# Patient Record
Sex: FEMALE | Race: WHITE | NOT HISPANIC OR LATINO | Employment: OTHER | URBAN - NONMETROPOLITAN AREA
[De-identification: names, ages, dates, MRNs, and addresses within clinical notes are randomized per-mention and may not be internally consistent; named-entity substitution may affect disease eponyms.]

---

## 2021-02-25 ENCOUNTER — IMMUNIZATION (OUTPATIENT)
Dept: VACCINE CLINIC | Facility: HOSPITAL | Age: 66
End: 2021-02-25

## 2021-02-25 PROCEDURE — 0001A: CPT | Performed by: INTERNAL MEDICINE

## 2021-02-25 PROCEDURE — 91300 HC SARSCOV02 VAC 30MCG/0.3ML IM: CPT | Performed by: INTERNAL MEDICINE

## 2021-03-18 ENCOUNTER — IMMUNIZATION (OUTPATIENT)
Dept: VACCINE CLINIC | Facility: HOSPITAL | Age: 66
End: 2021-03-18

## 2021-03-18 PROCEDURE — 91300 HC SARSCOV02 VAC 30MCG/0.3ML IM: CPT | Performed by: INTERNAL MEDICINE

## 2021-03-18 PROCEDURE — 0002A: CPT | Performed by: INTERNAL MEDICINE

## 2021-10-20 ENCOUNTER — APPOINTMENT (OUTPATIENT)
Dept: VACCINE CLINIC | Facility: HOSPITAL | Age: 66
End: 2021-10-20

## 2021-10-27 ENCOUNTER — IMMUNIZATION (OUTPATIENT)
Dept: VACCINE CLINIC | Facility: HOSPITAL | Age: 66
End: 2021-10-27

## 2021-10-27 PROCEDURE — 91300 HC SARSCOV02 VAC 30MCG/0.3ML IM: CPT | Performed by: INTERNAL MEDICINE

## 2021-10-27 PROCEDURE — 0004A ADM SARSCOV2 30MCG/0.3ML BOOSTER: CPT | Performed by: INTERNAL MEDICINE

## 2024-07-11 PROBLEM — Z79.899 HIGH RISK MEDICATION USE: Status: ACTIVE | Noted: 2024-07-11

## 2024-07-11 PROBLEM — Z79.1 LONG TERM (CURRENT) USE OF NON-STEROIDAL ANTI-INFLAMMATORIES (NSAID): Status: ACTIVE | Noted: 2024-07-11

## 2024-07-11 PROBLEM — M05.79 RHEUMATOID ARTHRITIS INVOLVING MULTIPLE SITES WITH POSITIVE RHEUMATOID FACTOR: Status: ACTIVE | Noted: 2024-07-11

## 2024-07-11 PROBLEM — R53.83 OTHER FATIGUE: Status: ACTIVE | Noted: 2024-07-11

## 2024-07-11 NOTE — ASSESSMENT & PLAN NOTE
dx 1998 PG; sero +  Daughters Mikayla Acosta  Current:  mtx, Celebrex;   Prior Plaquenil stopped by Ophthalmology 2023  declines prednisone/Enbrel historically  prior ssz intolerant, etodolac (gastritis),    Low dz activity.  Swollen joint count 0.  Tender joint count 0.  Good prognosis  continue mtx and celebrex.  Medications effective and well tolerated    Labs reviewed in [facility] from 4/24/2024.  ALT was mildly elevated at 37.  Otherwise labs are stable.  Medication refilled.  She does have inactivity gelling that we discussed is due to osteoarthritis.  The patient is doing satisfactorily on the current medical therapy.   Plan will be to continue current medication, frequent intensive lab monitoring every 12 weeks (CBC, CMP) for toxicity monitoring and follow up in 3 months.  Request latest bone density scan from PCP

## 2024-07-11 NOTE — ASSESSMENT & PLAN NOTE
Intermittent use of celecoxib.    Risks of NSAIDs discussed including GI upset, GI bleeding, renal and hepatic risks and the risks of cardiovascular disease and stroke. Warned patient not to take with other NSAIDs including OTC NSAIDs.

## 2024-07-11 NOTE — ASSESSMENT & PLAN NOTE
Chest x-ray 4/16/2024 reviewed in [facility].  Chest x-ray showed no acute cardiopulmonary process  Lab testing was stable for 2/2024.  This was reviewed in [facility].  Labs included thyroid testing and iron testing.  Vitamin D and B12 were also normal.

## 2024-07-11 NOTE — ASSESSMENT & PLAN NOTE
Methotrexate    labs q 12 weeks for toxicity monitoring.  meds are well-tolerated and effective.    Labs reviewed and are stable   We discussed DMARD therapy including Plaquenil, Sulfasalazine,  Leflunomide, and MTX and the alternative of not being treated. MTX was chosen. Risks include but are not limited to severe liver damage that can be fatal, the possible need for liver biopsy, bone marrow suppression that can lead to dangerously low blood counts, GI side effects including mouth sores and diarrhea, fatigue, and rare risk of severe pulmonary complications. There should be no alcohol consumed with MTX. MTX can cause severe fetal abnormalities whether the mother or father is taking the medication and thus must be avoided if pregnancy is a possibility.  All medication is to be taken one day a week only. The need for q 12 week labs and the need for folic acid supplementation were discussed.

## 2024-07-15 PROBLEM — R79.89 ELEVATED LIVER FUNCTION TESTS: Status: ACTIVE | Noted: 2024-07-15

## 2024-07-16 ENCOUNTER — OFFICE VISIT (OUTPATIENT)
Age: 69
End: 2024-07-16
Payer: MEDICARE

## 2024-07-16 VITALS
HEIGHT: 63 IN | TEMPERATURE: 97.6 F | HEART RATE: 98 BPM | DIASTOLIC BLOOD PRESSURE: 84 MMHG | BODY MASS INDEX: 32.18 KG/M2 | SYSTOLIC BLOOD PRESSURE: 146 MMHG | WEIGHT: 181.6 LBS

## 2024-07-16 DIAGNOSIS — R53.83 OTHER FATIGUE: ICD-10-CM

## 2024-07-16 DIAGNOSIS — M05.79 RHEUMATOID ARTHRITIS INVOLVING MULTIPLE SITES WITH POSITIVE RHEUMATOID FACTOR: Primary | ICD-10-CM

## 2024-07-16 DIAGNOSIS — Z79.899 HIGH RISK MEDICATION USE: ICD-10-CM

## 2024-07-16 DIAGNOSIS — Z79.1 LONG TERM (CURRENT) USE OF NON-STEROIDAL ANTI-INFLAMMATORIES (NSAID): ICD-10-CM

## 2024-07-16 PROCEDURE — 1160F RVW MEDS BY RX/DR IN RCRD: CPT | Performed by: NURSE PRACTITIONER

## 2024-07-16 PROCEDURE — G2211 COMPLEX E/M VISIT ADD ON: HCPCS | Performed by: NURSE PRACTITIONER

## 2024-07-16 PROCEDURE — 1159F MED LIST DOCD IN RCRD: CPT | Performed by: NURSE PRACTITIONER

## 2024-07-16 PROCEDURE — 99214 OFFICE O/P EST MOD 30 MIN: CPT | Performed by: NURSE PRACTITIONER

## 2024-07-16 RX ORDER — METHOTREXATE 2.5 MG/1
20 TABLET ORAL WEEKLY
Qty: 96 TABLET | Refills: 1 | Status: SHIPPED | OUTPATIENT
Start: 2024-07-16

## 2024-07-16 RX ORDER — ERGOCALCIFEROL 1.25 MG/1
1 CAPSULE ORAL WEEKLY
COMMUNITY
Start: 2024-05-26

## 2024-07-16 RX ORDER — CELECOXIB 200 MG/1
200 CAPSULE ORAL DAILY PRN
Qty: 90 CAPSULE | Refills: 1 | Status: SHIPPED | OUTPATIENT
Start: 2024-07-16

## 2024-07-16 NOTE — PROGRESS NOTES
Office Visit       Date: 2024   Patient Name: Bonita Acosta  MRN: [id number]  YOB: 1955    Referring Physician: Heavenly Estrada MD     Chief Complaint:   Chief Complaint   Patient presents with    Rheumatoid Arthritis       History of Present Illness: Bonita Acosta is a 68 y.o. female who is here today for follow-up of rheumatoid arthritis.  Today she reports feeling the same.  She rates her pain 2 out of 10 and has 1 hour of morning stiffness.  She denies any recent injuries or infections.  She would like a refill of methotrexate and Celebrex today.      Subjective   Review of Systems:  Review of Systems   Constitutional:  Positive for fatigue.   HENT:  Positive for rhinorrhea.         Nasal sores   Respiratory:  Positive for cough and shortness of breath.    Endocrine:        Hair loss   Musculoskeletal:  Positive for arthralgias, back pain, joint swelling and neck stiffness.   Skin:         Photosensitivity   Neurological:  Positive for dizziness and numbness.   Psychiatric/Behavioral:  Positive for confusion and dysphoric mood. The patient is nervous/anxious.    All other systems reviewed and are negative.       Past Medical History:   Past Medical History:   Diagnosis Date    Abnormal Pap smear of cervix     Arthritis     Unspecified    Cervical dysplasia     Elevated liver function tests     Fatigue     High risk medication use     HPV in female     Hypertension     Long term (current) use of non-steroidal anti-inflammatories (nsaid)     Menopause     Osteopenia     Rheumatoid arthritis with rheumatoid factor of multiple sites without organ or systems involvement     Screening breast examination     admits       Past Surgical History:   Past Surgical History:   Procedure Laterality Date     SECTION      COLPOSCOPY  2005    mild dysplasia & 10/2007       Family History:   Family History   Problem Relation Age of Onset    Breast cancer Other     Diabetes Other      Heart disease Other     Hypertension Other     Osteoporosis Other     Arthritis Other        Social History:   Social History     Socioeconomic History    Marital status:    Tobacco Use    Smoking status: Never    Smokeless tobacco: Never   Vaping Use    Vaping status: Never Used   Substance and Sexual Activity    Alcohol use: Never    Drug use: Never    Sexual activity: Not Currently       Medications:   Current Outpatient Medications:     B Complex Vitamins (VITAMIN B COMPLEX PO), Take  by mouth Daily., Disp: , Rfl:     celecoxib (CeleBREX) 200 MG capsule, Take 1 capsule by mouth Daily As Needed (JOINT PAIN)., Disp: 90 capsule, Rfl: 1    Cobalamin Combinations (B-12) 100-5000 MCG sublingual tablet, Place 1 tablet under the tongue Daily., Disp: , Rfl:     folic acid (FOLVITE) 800 MCG tablet, Take 2 tablets by mouth Daily., Disp: , Rfl:     hydrocortisone 2.5 % cream, Apply  topically to the appropriate area as directed 2 (Two) Times a Day., Disp: , Rfl:     levothyroxine (SYNTHROID, LEVOTHROID) 50 MCG tablet, Take 1 tablet by mouth Daily., Disp: , Rfl:     methotrexate 2.5 MG tablet, Take 8 tablets by mouth 1 (One) Time Per Week., Disp: 96 tablet, Rfl: 1    omeprazole (priLOSEC) 20 MG capsule, Take 1 capsule by mouth Daily As Needed., Disp: , Rfl:     pravastatin (PRAVACHOL) 20 MG tablet, Take 1 tablet by mouth Daily., Disp: , Rfl:     venlafaxine XR (EFFEXOR-XR) 150 MG 24 hr capsule, Take 1 capsule by mouth Daily., Disp: , Rfl:     vitamin D (ERGOCALCIFEROL) 1.25 MG (03821 UT) capsule capsule, Take 1 capsule by mouth 1 (One) Time Per Week., Disp: , Rfl:     Allergies: No Known Allergies    I have reviewed and updated the patient's chief complaint, history of present illness, review of systems, past medical history, surgical history, family history, social history, medications and allergy list as appropriate.     Objective    Vital Signs:   Vitals:    07/16/24 1419   BP: 146/84   BP Location: Left arm  "  Patient Position: Sitting   Cuff Size: Adult   Pulse: 98   Temp: 97.6 °F (36.4 °C)   Weight: 82.4 kg (181 lb 9.6 oz)   Height: 158.8 cm (62.5\")   PainSc:   2   PainLoc: Generalized  Comment: joints     Body mass index is 32.69 kg/m².   Defer to PCP    Physical Exam:  Physical Exam  Vitals reviewed.   Constitutional:       Appearance: Normal appearance. She is obese.   HENT:      Head: Normocephalic and atraumatic.      Mouth/Throat:      Mouth: Mucous membranes are moist.   Eyes:      Conjunctiva/sclera: Conjunctivae normal.   Cardiovascular:      Rate and Rhythm: Normal rate and regular rhythm.      Pulses: Normal pulses.      Heart sounds: Normal heart sounds.   Pulmonary:      Effort: Pulmonary effort is normal.      Breath sounds: Normal breath sounds.   Musculoskeletal:         General: Normal range of motion.      Cervical back: Normal range of motion and neck supple.      Comments: Heberden's on all DIP's and they are tender.  No synovitis or soft tissue swelling.  She has mild deviation of the 2nd and 4th toes, towards the 3rd toe, on left foot.   Feet:      Left foot:      Toenail Condition: Left toenails are abnormally thick. Fungal disease present.     Comments: 1st toe left foot.  Skin:     General: Skin is warm and dry.   Neurological:      General: No focal deficit present.      Mental Status: She is alert and oriented to person, place, and time. Mental status is at baseline.   Psychiatric:         Mood and Affect: Mood normal.         Behavior: Behavior normal.         Thought Content: Thought content normal.         Judgment: Judgment normal.          Results Review:   Imaging Results (Last 24 Hours)       ** No results found for the last 24 hours. **            Procedures    Assessment / Plan    Assessment/Plan:   Diagnoses and all orders for this visit:    1. Rheumatoid arthritis involving multiple sites with positive rheumatoid factor (Primary)  Assessment & Plan:  dx 1998 PG; sero +  Daughters " Mikayla Chicasser  Current:  mtx, Celebrex;   Prior Plaquenil stopped by Ophthalmology 2023  declines prednisone/Enbrel historically  prior ssz intolerant, etodolac (gastritis),    Low dz activity.  Swollen joint count 0.  Tender joint count 0.  Good prognosis  continue mtx and celebrex.  Medications effective and well tolerated    Labs reviewed in [facility] from 4/24/2024.  ALT was mildly elevated at 37.  Otherwise labs are stable.  Medication refilled.  She does have inactivity gelling that we discussed is due to osteoarthritis.  The patient is doing satisfactorily on the current medical therapy.   Plan will be to continue current medication, frequent intensive lab monitoring every 12 weeks (CBC, CMP) for toxicity monitoring and follow up in 3 months.  Request latest bone density scan from PCP    Orders:  -     C-reactive Protein; Standing  -     Sedimentation Rate; Standing  -     Comprehensive Metabolic Panel; Standing  -     CBC With Manual Differential; Standing  -     methotrexate 2.5 MG tablet; Take 8 tablets by mouth 1 (One) Time Per Week.  Dispense: 96 tablet; Refill: 1    2. High risk medication use  Assessment & Plan:  Methotrexate    labs q 12 weeks for toxicity monitoring.  meds are well-tolerated and effective.    Labs reviewed and are stable   We discussed DMARD therapy including Plaquenil, Sulfasalazine,  Leflunomide, and MTX and the alternative of not being treated. MTX was chosen. Risks include but are not limited to severe liver damage that can be fatal, the possible need for liver biopsy, bone marrow suppression that can lead to dangerously low blood counts, GI side effects including mouth sores and diarrhea, fatigue, and rare risk of severe pulmonary complications. There should be no alcohol consumed with MTX. MTX can cause severe fetal abnormalities whether the mother or father is taking the medication and thus must be avoided if pregnancy is a possibility.  All medication is to be taken one day a  week only. The need for q 12 week labs and the need for folic acid supplementation were discussed.    Orders:  -     C-reactive Protein; Standing  -     Sedimentation Rate; Standing  -     Comprehensive Metabolic Panel; Standing  -     CBC With Manual Differential; Standing    3. Long term (current) use of non-steroidal anti-inflammatories (nsaid)  Assessment & Plan:  Intermittent use of celecoxib.    Risks of NSAIDs discussed including GI upset, GI bleeding, renal and hepatic risks and the risks of cardiovascular disease and stroke. Warned patient not to take with other NSAIDs including OTC NSAIDs.      Orders:  -     celecoxib (CeleBREX) 200 MG capsule; Take 1 capsule by mouth Daily As Needed (JOINT PAIN).  Dispense: 90 capsule; Refill: 1    4. Other fatigue  Assessment & Plan:  Chest x-ray 4/16/2024 reviewed in [facility].  Chest x-ray showed no acute cardiopulmonary process  Lab testing was stable for 2/2024.  This was reviewed in [facility].  Labs included thyroid testing and iron testing.  Vitamin D and B12 were also normal.          Follow Up:   Return in about 3 months (around 10/16/2024) for Dr. Isaacs, MEHDI Culver.        MEHDI Lopez  [facility]

## 2024-11-25 ENCOUNTER — TELEPHONE (OUTPATIENT)
Age: 69
End: 2024-11-25
Payer: MEDICARE

## 2024-11-25 NOTE — TELEPHONE ENCOUNTER
Caller: Bonita Acosta    Relationship: Self    Best call back number: [phone]    What orders are you requesting (i.e. lab or imaging): LABS    In what timeframe would the patient need to come in: PRIOR TO NEXT APPOINTMENT 01/02/2025    Where will you receive your lab/imaging services: [facility] [place], [place]     Additional notes: PATIENT STATES SHE IS NEEDING NEW LAB ORDERS TO BE SENT TO THIS FACILITY PRIOR TO HER APPOINTMENT IN JANUARY, PLEASE ADVISE.

## 2025-02-19 ENCOUNTER — TELEPHONE (OUTPATIENT)
Age: 70
End: 2025-02-19
Payer: MEDICARE

## 2025-03-31 DIAGNOSIS — M05.79 RHEUMATOID ARTHRITIS INVOLVING MULTIPLE SITES WITH POSITIVE RHEUMATOID FACTOR: ICD-10-CM

## 2025-03-31 RX ORDER — METHOTREXATE 2.5 MG/1
TABLET ORAL
Qty: 96 TABLET | Refills: 1 | Status: SHIPPED | OUTPATIENT
Start: 2025-03-31

## 2025-04-22 ENCOUNTER — TELEPHONE (OUTPATIENT)
Age: 70
End: 2025-04-22
Payer: MEDICARE

## 2025-04-22 DIAGNOSIS — Z79.1 LONG TERM (CURRENT) USE OF NON-STEROIDAL ANTI-INFLAMMATORIES (NSAID): ICD-10-CM

## 2025-04-22 DIAGNOSIS — Z79.899 HIGH RISK MEDICATION USE: ICD-10-CM

## 2025-04-22 DIAGNOSIS — M05.79 RHEUMATOID ARTHRITIS INVOLVING MULTIPLE SITES WITH POSITIVE RHEUMATOID FACTOR: Primary | ICD-10-CM

## 2025-04-22 NOTE — TELEPHONE ENCOUNTER
Caller: Bonita Acosta    Relationship to patient: Self    Best call back number: [phone]    Patient is needing: PT NEEDS HER LAB ORDER FAXED TO THE [facility] IN [place] SO SHE CAN GO GET THEM DONE TOMORROW. FAX # is [phone].

## 2025-04-24 NOTE — ASSESSMENT & PLAN NOTE
dx 1998 PG; sero +  Daughters Mikayla Acosta  Current:  mtx, Celebrex;   Prior Plaquenil stopped by Ophthalmology 2023  declines prednisone/Enbrel historically  prior ssz intolerant, etodolac (gastritis),    Low dz activity.  Swollen joint count 0.  Tender joint count 0.  Good prognosis  continue mtx and celebrex.  Medications effective and well tolerated    Labs reviewed from 2/2024.  Labs are stable.  Medication refilled.  No flares.  She did have Covid-29 in November 2024.  She does have inactivity gelling that we discussed is due to osteoarthritis.  The patient is doing satisfactorily on the current medical therapy.   Plan will be to continue current medication, frequent intensive lab monitoring every 12 weeks (CBC, CMP) for toxicity monitoring and follow up in 3 months.  New lab order, for [facility] per patient choice, given to patient today.  Requested labs from last week at [facility].

## 2025-04-29 ENCOUNTER — TELEPHONE (OUTPATIENT)
Age: 70
End: 2025-04-29

## 2025-04-29 ENCOUNTER — OFFICE VISIT (OUTPATIENT)
Age: 70
End: 2025-04-29
Payer: MEDICARE

## 2025-04-29 VITALS
TEMPERATURE: 97.5 F | HEIGHT: 63 IN | HEART RATE: 98 BPM | BODY MASS INDEX: 34.3 KG/M2 | SYSTOLIC BLOOD PRESSURE: 140 MMHG | WEIGHT: 193.6 LBS | DIASTOLIC BLOOD PRESSURE: 82 MMHG

## 2025-04-29 DIAGNOSIS — Z78.0 POSTMENOPAUSE: ICD-10-CM

## 2025-04-29 DIAGNOSIS — Z79.899 HIGH RISK MEDICATION USE: ICD-10-CM

## 2025-04-29 DIAGNOSIS — Z79.1 LONG TERM (CURRENT) USE OF NON-STEROIDAL ANTI-INFLAMMATORIES (NSAID): ICD-10-CM

## 2025-04-29 DIAGNOSIS — M05.79 RHEUMATOID ARTHRITIS INVOLVING MULTIPLE SITES WITH POSITIVE RHEUMATOID FACTOR: Primary | ICD-10-CM

## 2025-04-29 RX ORDER — METHOTREXATE 2.5 MG/1
20 TABLET ORAL WEEKLY
Qty: 96 TABLET | Refills: 1 | Status: SHIPPED | OUTPATIENT
Start: 2025-04-29

## 2025-04-29 RX ORDER — CELECOXIB 200 MG/1
200 CAPSULE ORAL DAILY PRN
Qty: 90 CAPSULE | Refills: 1 | Status: SHIPPED | OUTPATIENT
Start: 2025-04-29

## 2025-04-29 RX ORDER — ROSUVASTATIN CALCIUM 10 MG/1
10 TABLET, COATED ORAL DAILY
COMMUNITY
Start: 2024-11-19 | End: 2025-11-19

## 2025-04-29 NOTE — PROGRESS NOTES
Office Visit       Date: 2025   Patient Name: Bonita Acosta  MRN: [id number]  YOB: 1955    Referring Physician: No ref. provider found     Chief Complaint:   Chief Complaint   Patient presents with    Rheumatoid Arthritis       History of Present Illness: Bonita Acosta is a 69 y.o. female who is here today for follow-up of rheumatoid arthritis.  We prescribed her methotrexate, folic acid and Celebrex.  We last saw her 2024.  Patient reports pain is 3 out of 10, global 3 out of 10 and 1 hour morning stiffness.  She would like a refill of methotrexate today.    Subjective   Review of Systems:  Review of Systems   All other systems reviewed and are negative.       Past Medical History:   Past Medical History:   Diagnosis Date    Abnormal Pap smear of cervix     Arthritis     Unspecified    Cervical dysplasia     Elevated liver function tests     Fatigue     High risk medication use     HPV in female     Hypertension     Long term (current) use of non-steroidal anti-inflammatories (nsaid)     Menopause     Osteopenia     Rheumatoid arthritis with rheumatoid factor of multiple sites without organ or systems involvement     Screening breast examination     admits       Past Surgical History:   Past Surgical History:   Procedure Laterality Date     SECTION      COLPOSCOPY  2005    mild dysplasia & 10/2007       Family History:   Family History   Problem Relation Age of Onset    Breast cancer Other     Diabetes Other     Heart disease Other     Hypertension Other     Osteoporosis Other     Arthritis Other        Social History:   Social History     Socioeconomic History    Marital status:    Tobacco Use    Smoking status: Never     Passive exposure: Past    Smokeless tobacco: Never   Vaping Use    Vaping status: Never Used   Substance and Sexual Activity    Alcohol use: Never    Drug use: Never    Sexual activity: Not Currently       Medications:   Current  "Outpatient Medications:     B Complex Vitamins (VITAMIN B COMPLEX PO), Take  by mouth Daily., Disp: , Rfl:     celecoxib (CeleBREX) 200 MG capsule, Take 1 capsule by mouth Daily As Needed (JOINT PAIN)., Disp: 90 capsule, Rfl: 1    Cobalamin Combinations (B-12) 100-5000 MCG sublingual tablet, Place 1 tablet under the tongue Daily., Disp: , Rfl:     folic acid (FOLVITE) 800 MCG tablet, Take 2 tablets by mouth Daily., Disp: , Rfl:     hydrocortisone 2.5 % cream, Apply  topically to the appropriate area as directed 2 (Two) Times a Day., Disp: , Rfl:     levothyroxine (SYNTHROID, LEVOTHROID) 50 MCG tablet, Take 1 tablet by mouth Daily., Disp: , Rfl:     methotrexate 2.5 MG tablet, Take 8 tablets by mouth 1 (One) Time Per Week., Disp: 96 tablet, Rfl: 1    omeprazole (priLOSEC) 20 MG capsule, Take 1 capsule by mouth Daily As Needed., Disp: , Rfl:     rosuvastatin (CRESTOR) 10 MG tablet, Take 1 tablet by mouth Daily., Disp: , Rfl:     venlafaxine XR (EFFEXOR-XR) 150 MG 24 hr capsule, Take 1 capsule by mouth Daily., Disp: , Rfl:     vitamin D (ERGOCALCIFEROL) 1.25 MG (13021 UT) capsule capsule, Take 1 capsule by mouth 1 (One) Time Per Week., Disp: , Rfl:     pravastatin (PRAVACHOL) 20 MG tablet, Take 1 tablet by mouth Daily. (Patient not taking: Reported on 4/29/2025), Disp: , Rfl:     Allergies: No Known Allergies    I have reviewed and updated the patient's chief complaint, history of present illness, review of systems, past medical history, surgical history, family history, social history, medications and allergy list as appropriate.     Objective    Vital Signs:   Vitals:    04/29/25 1057   BP: 140/82   BP Location: Left arm   Patient Position: Sitting   Cuff Size: Adult   Pulse: 98   Temp: 97.5 °F (36.4 °C)   Weight: 87.8 kg (193 lb 9.6 oz)   Height: 158.8 cm (62.5\")   PainSc: 3      Body mass index is 34.85 kg/m².   Defer to PCP       Physical Exam:  Physical Exam  Vitals reviewed.   Constitutional:       Appearance: " Normal appearance.   HENT:      Head: Normocephalic and atraumatic.      Mouth/Throat:      Mouth: Mucous membranes are moist.   Eyes:      Conjunctiva/sclera: Conjunctivae normal.   Cardiovascular:      Rate and Rhythm: Normal rate and regular rhythm.      Pulses: Normal pulses.      Heart sounds: Normal heart sounds.   Pulmonary:      Effort: Pulmonary effort is normal.      Breath sounds: Normal breath sounds.   Musculoskeletal:         General: Normal range of motion.      Cervical back: Normal range of motion and neck supple.      Comments: No synovitis  Heberden bilaterally  NO RA nodules or deformities  Crepitus bilateral knees   Skin:     General: Skin is warm and dry.   Neurological:      General: No focal deficit present.      Mental Status: She is alert and oriented to person, place, and time. Mental status is at baseline.   Psychiatric:         Mood and Affect: Mood normal.         Behavior: Behavior normal.         Thought Content: Thought content normal.         Judgment: Judgment normal.          Results Review:   Imaging Results (Last 24 Hours)       ** No results found for the last 24 hours. **            Procedures    Assessment / Plan    Assessment/Plan:   Diagnoses and all orders for this visit:    1. Rheumatoid arthritis involving multiple sites with positive rheumatoid factor (Primary)  Assessment & Plan:  dx 1998 PG; sero +  Daughters Mikayla Acosta  Current:  mtx, Celebrex;   Prior Plaquenil stopped by Ophthalmology 2023  declines prednisone/Enbrel historically  prior ssz intolerant, etodolac (gastritis),    Low dz activity.  Swollen joint count 0.  Tender joint count 0.  Good prognosis  continue mtx and celebrex.  Medications effective and well tolerated    Labs reviewed from 2/2024.  Labs are stable.  Medication refilled.  No flares.  She did have Covid-29 in November 2024.  She does have inactivity gelling that we discussed is due to osteoarthritis.  The patient is doing satisfactorily on  the current medical therapy.   Plan will be to continue current medication, frequent intensive lab monitoring every 12 weeks (CBC, CMP) for toxicity monitoring and follow up in 3 months.  New lab order, for [facility] per patient choice, given to patient today.  Requested labs from last week at [facility].    Orders:  -     C-reactive Protein; Standing  -     Sedimentation Rate; Standing  -     Comprehensive Metabolic Panel; Standing  -     CBC & Differential; Future  -     methotrexate 2.5 MG tablet; Take 8 tablets by mouth 1 (One) Time Per Week.  Dispense: 96 tablet; Refill: 1    2. High risk medication use  Assessment & Plan:  Methotrexate    labs q 12 weeks for toxicity monitoring.  meds are well-tolerated and effective.    Labs reviewed and are stable   We discussed DMARD therapy including Plaquenil, Sulfasalazine,  Leflunomide, and MTX and the alternative of not being treated. MTX was chosen. Risks include but are not limited to severe liver damage that can be fatal, the possible need for liver biopsy, bone marrow suppression that can lead to dangerously low blood counts, GI side effects including mouth sores and diarrhea, fatigue, and rare risk of severe pulmonary complications. There should be no alcohol consumed with MTX. MTX can cause severe fetal abnormalities whether the mother or father is taking the medication and thus must be avoided if pregnancy is a possibility.  All medication is to be taken one day a week only. The need for q 12 week labs and the need for folic acid supplementation were discussed.    Orders:  -     C-reactive Protein; Standing  -     Sedimentation Rate; Standing  -     Comprehensive Metabolic Panel; Standing  -     CBC & Differential; Future    3. Long term (current) use of non-steroidal anti-inflammatories (nsaid)  Assessment & Plan:  Intermittent use of celecoxib.    Risks of NSAIDs discussed including GI upset, GI bleeding, renal and hepatic risks and the risks of cardiovascular  disease and stroke. Warned patient not to take with other NSAIDs including OTC NSAIDs.      Orders:  -     celecoxib (CeleBREX) 200 MG capsule; Take 1 capsule by mouth Daily As Needed (JOINT PAIN).  Dispense: 90 capsule; Refill: 1    4. Postmenopause  -     DEXA Bone Density Axial; Future        Follow Up:   Return in about 4 months (around 8/29/2025) for Dr. Isaacs.        MEHDI Lopez   [facility]